# Patient Record
Sex: MALE | Race: WHITE | NOT HISPANIC OR LATINO | URBAN - METROPOLITAN AREA
[De-identification: names, ages, dates, MRNs, and addresses within clinical notes are randomized per-mention and may not be internally consistent; named-entity substitution may affect disease eponyms.]

---

## 2023-05-03 ENCOUNTER — TELEMEDICINE (OUTPATIENT)
Dept: PRIMARY CARE | Facility: CLINIC | Age: 33
End: 2023-05-03
Payer: COMMERCIAL

## 2023-05-03 DIAGNOSIS — U07.1 COVID-19: Primary | ICD-10-CM

## 2023-05-03 PROCEDURE — 99214 OFFICE O/P EST MOD 30 MIN: CPT | Performed by: STUDENT IN AN ORGANIZED HEALTH CARE EDUCATION/TRAINING PROGRAM

## 2023-05-03 RX ORDER — NIRMATRELVIR AND RITONAVIR 300-100 MG
KIT ORAL
Qty: 30 TABLET | Refills: 0 | Status: SHIPPED | OUTPATIENT
Start: 2023-05-03

## 2023-05-03 NOTE — PROGRESS NOTES
Subjective   Patient ID: Ben Sierra is a 33 y.o. male who presents for positive for COVID-19.    HPI   Approximately 2 days ago he started to have nasal congestion, sore throat, and severe body aches.  He felt this may have been just a cold, did go to work the following day, but his symptoms continue to get worse.  He did test himself after he got home from work yesterday and he was positive for COVID-19.  He tested himself 2 times.  He is concerned, that he needs to travel with his mom who has significant core morbidities within the next week, he desires to have his COVID-19 treated with medication such as Paxlovid at this time to prevent the possible spread to his mom.  Admits to associated diarrhea.  Denies chest pain, shortness of breath, headache, dizziness, loss of taste or smell.  Review of Systems  All pertinent positive symptoms are included in the history of present illness.     All other systems have been reviewed and are negative and noncontributory to this patient's current ailments.  Objective   There were no vitals taken for this visit.    Physical Exam  CONSTITUTIONAL - well nourished, well developed, looks like stated age, in no acute distress, not ill-appearing, and not tired appearing  SKIN - normal skin color and pigmentation, normal skin turgor without rash, lesions, or nodules visualized  HEAD - no trauma, normocephalic  EYES - normal external exam  CHEST -no distressed breathing, good effort  EXTREMITIES - no edema, no deformities  NEUROLOGICAL - normal balance, normal motor, no ataxia  PSYCHIATRIC - alert, pleasant and cordial, age-appropriate  Assessment/Plan   Positive for COVID-19  We had a long discussion about the risks and benefits of taking medication versus conservative treatment.    Even though, your risks are low at this time.  We discussed that due to the high risk of your mother has significant comorbidities to treat with Paxlovid to try and prevent the spread to her.   I    Paxlovid has been sent to the nearest [facility].    I also discussed conservative treatment with rest, plenty of fluids, and eating as tolerated.    If you develop any chest pain or shortness of breath please go to the emergency room immediately to be further evaluated.